# Patient Record
Sex: MALE | URBAN - METROPOLITAN AREA
[De-identification: names, ages, dates, MRNs, and addresses within clinical notes are randomized per-mention and may not be internally consistent; named-entity substitution may affect disease eponyms.]

---

## 2018-08-31 ENCOUNTER — NURSE ONLY (OUTPATIENT)
Dept: FAMILY MEDICINE CLINIC | Facility: CLINIC | Age: 28
End: 2018-08-31

## 2018-08-31 VITALS
OXYGEN SATURATION: 98 % | HEART RATE: 90 BPM | RESPIRATION RATE: 16 BRPM | DIASTOLIC BLOOD PRESSURE: 70 MMHG | SYSTOLIC BLOOD PRESSURE: 118 MMHG | WEIGHT: 158 LBS | TEMPERATURE: 99 F

## 2018-08-31 DIAGNOSIS — H66.91 RIGHT ACUTE OTITIS MEDIA: Primary | ICD-10-CM

## 2018-08-31 DIAGNOSIS — H60.333 ACUTE SWIMMER'S EAR OF BOTH SIDES: ICD-10-CM

## 2018-08-31 PROCEDURE — 99202 OFFICE O/P NEW SF 15 MIN: CPT | Performed by: PHYSICIAN ASSISTANT

## 2018-08-31 RX ORDER — AMOXICILLIN 875 MG/1
875 TABLET, COATED ORAL 2 TIMES DAILY
Qty: 20 TABLET | Refills: 0 | Status: SHIPPED | OUTPATIENT
Start: 2018-08-31

## 2018-08-31 RX ORDER — OFLOXACIN 3 MG/ML
5 SOLUTION AURICULAR (OTIC) DAILY
Qty: 1 BOTTLE | Refills: 0 | Status: SHIPPED | OUTPATIENT
Start: 2018-08-31 | End: 2018-09-07

## 2018-08-31 NOTE — PROGRESS NOTES
CHIEF COMPLAINT:   Patient presents with:  Ear Pain: right ear for 5 days,       HPI:   Grace Hightower is a 34year old male who presents for complaints of  Bilateral ear pain for 5 days, worse on the right.  used as patient [place] speaking.   Susannah Amos No visible dental caries. Posterior pharynx with no erythema,  no exudates, tonsils 1/4  NECK: supple, non-tender  LUNGS: non labored breathing,  clear to auscultation bilaterally, no wheezes or rhonchi.   CARDIO: RRR without murmur  EXTREMITIES: no cyanosi

## 2018-08-31 NOTE — PATIENT INSTRUCTIONS
[facility] [Middle [facility] In[facility], Adult]  Tiene kathryn infección del oído medio (el espacio detrás del tímpano).  Puede deberse a un resfriado común, cuando la congestión bloquea el pasaje interno (la “trompa de [place]) que drena el fluido © 6974-2032 [address]. [address] [facility] [place], [address] [facility]. Todos los derechos reservados. Esta información no pretende sustituir la atención médica profesional. Sólo abreu médico puede diagnosticar y tratar un problema de aspen. · No permita que le entre agua en el oído orlin el baño. Además, evite nadar hasta que se haya curado la infección.   Prevención  · Mantenga secos maulik oídos. Eso ayuda a reducir el riesgo de infección.  Seque maulik orejas con kathryn toalla o un secador de pelo

## 2019-09-23 ENCOUNTER — WALK IN (OUTPATIENT)
Dept: URGENT CARE | Age: 29
End: 2019-09-23

## 2019-09-23 DIAGNOSIS — H60.311 ACUTE DIFFUSE OTITIS EXTERNA OF RIGHT EAR: Primary | ICD-10-CM

## 2019-09-23 PROCEDURE — X0943 AMG SELF PAY VISIT: HCPCS | Performed by: NURSE PRACTITIONER

## 2019-09-23 RX ORDER — OFLOXACIN 3 MG/ML
5 SOLUTION AURICULAR (OTIC) 2 TIMES DAILY
Qty: 5 ML | Refills: 0 | Status: SHIPPED | OUTPATIENT
Start: 2019-09-23 | End: 2019-09-28

## 2019-09-23 SDOH — HEALTH STABILITY: MENTAL HEALTH: HOW OFTEN DO YOU HAVE A DRINK CONTAINING ALCOHOL?: 4 OR MORE TIMES A WEEK

## 2019-09-23 ASSESSMENT — PAIN SCALES - GENERAL: PAINLEVEL: 0

## 2019-09-23 ASSESSMENT — ENCOUNTER SYMPTOMS
DIARRHEA: 0
COUGH: 0
HEADACHES: 0
ABDOMINAL PAIN: 0
VOMITING: 0
RHINORRHEA: 0
SORE THROAT: 0

## 2021-05-26 VITALS
HEART RATE: 70 BPM | DIASTOLIC BLOOD PRESSURE: 72 MMHG | OXYGEN SATURATION: 96 % | SYSTOLIC BLOOD PRESSURE: 124 MMHG | TEMPERATURE: 98 F | RESPIRATION RATE: 16 BRPM

## (undated) NOTE — LETTER
Date: 8/31/2018    Patient Name: Kaye Kidd          To Whom it may concern: This letter has been written at the patient's request. The above patient was seen at the [facility] for treatment of a medical condition.     This patient should